# Patient Record
Sex: MALE | Race: WHITE | Employment: STUDENT | URBAN - METROPOLITAN AREA
[De-identification: names, ages, dates, MRNs, and addresses within clinical notes are randomized per-mention and may not be internally consistent; named-entity substitution may affect disease eponyms.]

---

## 2024-09-19 ENCOUNTER — OFFICE VISIT (OUTPATIENT)
Dept: ORTHOPEDIC SURGERY | Age: 19
End: 2024-09-19

## 2024-09-19 VITALS — BODY MASS INDEX: 21.82 KG/M2 | HEIGHT: 74 IN | WEIGHT: 170 LBS

## 2024-09-19 DIAGNOSIS — S43.432A SUPERIOR LABRUM ANTERIOR-TO-POSTERIOR (SLAP) TEAR OF LEFT SHOULDER: Primary | ICD-10-CM

## 2024-09-19 DIAGNOSIS — M25.511 RIGHT SHOULDER PAIN, UNSPECIFIED CHRONICITY: ICD-10-CM

## 2024-09-30 ENCOUNTER — EVALUATION (OUTPATIENT)
Dept: PHYSICAL THERAPY | Age: 19
End: 2024-09-30
Payer: COMMERCIAL

## 2024-09-30 DIAGNOSIS — M25.612 DECREASED ROM OF LEFT SHOULDER: ICD-10-CM

## 2024-09-30 DIAGNOSIS — M25.512 LEFT SHOULDER PAIN, UNSPECIFIED CHRONICITY: ICD-10-CM

## 2024-09-30 DIAGNOSIS — S43.432D SUPERIOR GLENOID LABRUM LESION OF LEFT SHOULDER, SUBSEQUENT ENCOUNTER: Primary | ICD-10-CM

## 2024-09-30 PROCEDURE — 97530 THERAPEUTIC ACTIVITIES: CPT | Performed by: PHYSICAL THERAPIST

## 2024-09-30 PROCEDURE — 97161 PT EVAL LOW COMPLEX 20 MIN: CPT | Performed by: PHYSICAL THERAPIST

## 2024-09-30 PROCEDURE — 97110 THERAPEUTIC EXERCISES: CPT | Performed by: PHYSICAL THERAPIST

## 2024-09-30 NOTE — PROGRESS NOTES
[facility]    [address] [place] [address]   P:[phone]  F:[phone]     Physical Therapy Initial Evaluation Certification      Dear Dr. Villalobos,    We had the pleasure of evaluating the following patient for physical therapy services at [facility].  A summary of our findings can be found in the initial assessment below.  This includes our plan of care.  If you have any questions or concerns regarding these findings, please do not hesitate to contact me at the office phone number listed above.  Thank you for the referral.     Physician Signature:_______________________________Date:__________________  By signing above (or electronic signature), therapist’s plan is approved by physician       Physical Therapy: TREATMENT/PROGRESS NOTE   Patient: Nate López (19 y.o. male)   Examination Date: 2024   :  2005 MRN: [id number]   Visit #: 1   Insurance Allowable Auth Needed   90 []Yes    [x]No    Insurance: Payor: [facility] / Plan: [facility] [place] PPO / Product Type: *No Product type* /   Insurance ID: [id number] - ([facility])  Secondary Insurance (if applicable):    Treatment Diagnosis:     ICD-10-CM    1. Superior glenoid labrum lesion of left shoulder, subsequent encounter  S43.432D       2. Left shoulder pain, unspecified chronicity  M25.512       3. Decreased ROM of left shoulder  M25.612          Medical Diagnosis:  Superior labrum anterior-to-posterior (SLAP) tear of left shoulder [S43.432A]   Referring Physician: Tae Stover DO  PCP: No primary care provider on file.     Plan of care signed (Y/N): N    Date of Patient follow up with Physician: not scheduled     Plan of Care Report: EVAL today  POC update due: (10 visits /OR AUTH LIMITS, whichever is less)  10/30/2024                                             Medical History:  Comorbidities:  None  Relevant Medical History: Hx of R elbow injury and GIRD of

## 2024-10-15 ENCOUNTER — OFFICE VISIT (OUTPATIENT)
Dept: ORTHOPEDIC SURGERY | Age: 19
End: 2024-10-15
Payer: COMMERCIAL

## 2024-10-15 VITALS — BODY MASS INDEX: 21.82 KG/M2 | WEIGHT: 170 LBS | HEIGHT: 74 IN

## 2024-10-15 DIAGNOSIS — M25.511 RIGHT SHOULDER PAIN, UNSPECIFIED CHRONICITY: Primary | ICD-10-CM

## 2024-10-15 DIAGNOSIS — S43.432A SUPERIOR LABRUM ANTERIOR-TO-POSTERIOR (SLAP) TEAR OF LEFT SHOULDER: ICD-10-CM

## 2024-10-15 PROCEDURE — 99213 OFFICE O/P EST LOW 20 MIN: CPT | Performed by: PHYSICIAN ASSISTANT

## 2024-10-15 NOTE — PROGRESS NOTES
[facility]  History and Physical  Shoulder Pain    Date:  10/15/2024    Name:  Nate López  Address:  [address] [place] [address]    :  2005      Age:   19 y.o.    SSN:  [id number]      Medical Record Number:  [id number]    Reason for Visit:    Shoulder Pain (LEFT SHOULDER)      HPI:   Nate López is a 19 y.o. male who presents to our office today for follow up of the left shoulder pain.  Patient unfortunately had an acute injury while he was [employer] boarding in [facility].  He injured his left shoulder and was asked to get an MRI to evaluate the shoulder for a labral tear.  Patient returns he has no pain at this time.  He has returned back to his gym workouts that includes heavy lifting and has not noticed any instability episodes with the left shoulder.  He denies any new injuries.      Pain Assessment  Location of Pain: Shoulder  Location Modifiers: Left  Severity of Pain: 0  Frequency of Pain: Intermittent  Aggravating Factors: Other (Comment)  Limiting Behavior: Some  Relieving Factors: Rest, Exercise  Work-Related Injury: No  Are there other pain locations you wish to document?: No    No notes on file    Review of Systems:  A 14 point review of systems available in the scanned medical record as documented by the patient and reviewed on 10/15/2024.  The review is negative with the exception of those things mentioned in the History of Present Illness and Past Medical History.      Past Medical History:  Patient's medications, allergies, past medical, surgical, social and family histories were reviewed and updated as appropriate.    Allergies:  No Known Allergies    Physical Exam:  There were no vitals filed for this visit.  General: Nate López is a healthy and well appearing 19 y.o. male who is sitting comfortably in our office in acute distress.     Skin:  There are no skin lesions, cellulitis, or extreme edema. The patient has warm

## 2024-10-24 ENCOUNTER — TREATMENT (OUTPATIENT)
Dept: PHYSICAL THERAPY | Age: 19
End: 2024-10-24
Payer: COMMERCIAL

## 2024-10-24 DIAGNOSIS — S43.432D SUPERIOR GLENOID LABRUM LESION OF LEFT SHOULDER, SUBSEQUENT ENCOUNTER: Primary | ICD-10-CM

## 2024-10-24 DIAGNOSIS — M25.612 DECREASED ROM OF LEFT SHOULDER: ICD-10-CM

## 2024-10-24 DIAGNOSIS — M25.512 LEFT SHOULDER PAIN, UNSPECIFIED CHRONICITY: ICD-10-CM

## 2024-10-24 PROCEDURE — 97110 THERAPEUTIC EXERCISES: CPT | Performed by: PHYSICAL THERAPIST

## 2024-10-24 PROCEDURE — 97112 NEUROMUSCULAR REEDUCATION: CPT | Performed by: PHYSICAL THERAPIST

## 2024-10-24 PROCEDURE — 97530 THERAPEUTIC ACTIVITIES: CPT | Performed by: PHYSICAL THERAPIST

## 2024-10-24 NOTE — PROGRESS NOTES
bilateral shoulder extension and prone bilateral horizontal abduction with cuing needed to pull his shoulder blades down and in and to keep his neck relaxed.  Also added wall clock with black tband which was challenging.  Introduced up/down and side/side with ball on wall today which was very fatiguing.  Reviewed/updated HEP with pt and recommended he continue the exercises 3x/week.  Pt declined new handouts.  Also recommended attending therapy consistently in order to progress his program and work towards functional goals.  Attendance has been difficult due to his work and school schedule.                Medical Necessity Documentation:  I certify that this patient meets the below criteria necessary for medical necessity for care and/or justification of therapy services:  The patient has functional impairments and/or activity limitations and would benefit from continued outpatient therapy services to address the deficits outlined in the patients goals    Return to Play: NA    Prognosis for POC: [x] Good [] Fair  [] Poor    Patient requires continued skilled intervention: [x] Yes  [] No      CHARGE CAPTURE     PT CHARGE GRID   CPT Code (TIMED) minutes # CPT Code (UNTIMED) #     Therex (55732)  20' 1  EVAL:LOW (72668 - Typically 20 minutes face-to-face)     Neuromusc. Re-ed (16925) 12' 1  Re-Eval (94924)     Manual (41212)    Estim Unattended (41487)     Ther. Act (96495) 15' 1  Mech. Traction (47791)     Gait (78734)    Dry Needle 1-2 muscle (20560)     Aquatic Therex (74121)    Dry Needle 3+ muscle (20561)     Iontophoresis (48422)    VASO (09613)     Ultrasound (71988)    Group Therapy (15746)     Estim Attended (67635)    Canalith Repositioning (45196)     Physical Performance Test (83294)         Other:    Other:    Total Timed Code Tx Minutes 47' 3       Total Treatment Minutes 50'        Charge Justification:  (51183) THERAPEUTIC EXERCISE - Provided verbal/tactile cueing for activities related to

## 2024-11-07 ENCOUNTER — TREATMENT (OUTPATIENT)
Dept: PHYSICAL THERAPY | Age: 19
End: 2024-11-07

## 2024-11-07 DIAGNOSIS — M25.612 DECREASED ROM OF LEFT SHOULDER: ICD-10-CM

## 2024-11-07 DIAGNOSIS — S43.432D SUPERIOR GLENOID LABRUM LESION OF LEFT SHOULDER, SUBSEQUENT ENCOUNTER: Primary | ICD-10-CM

## 2024-11-07 DIAGNOSIS — M25.512 LEFT SHOULDER PAIN, UNSPECIFIED CHRONICITY: ICD-10-CM

## 2024-11-07 NOTE — PROGRESS NOTES
neuromuscular reeducation of movement, balance, coordination, kinesthetic sense, posture, and/or proprioception for sitting and/or standing activities  (05823) HOME EXERCISE PROGRAM - Reviewed/Progressed HEP activities related to neuromuscular reeducation of movement, balance, coordination, kinesthetic sense, posture, and/or proprioception for sitting and/or standing activities    (12930) THERAPEUTIC ACTIVITY - use of dynamic activities to improve functional performance. (Ex include squatting, ascending/descending stairs, walking, bending, lifting, catching, throwing, pushing, pulling, jumping.)  Direct, one on one contact, billed in 15-minute increments.    GOALS (Goals updated 11/7/2024)     Patient stated goal: Improve strength in L RTC  [] Progressing: [x] Met: [] Not Met: [] Adjusted    Therapist goals for Patient:   Short Term Goals: To be achieved in: 2 weeks  1. Independent in HEP and progression per patient tolerance, in order to prevent re-injury.   [] Progressing: [x] Met: [] Not Met: [] Adjusted  2. Patient will have a decrease in pain to 0/10 to facilitate improvement in movement, function, and ADLs as indicated by Functional Deficits.  [x] Progressing: [] Met: [] Not Met: [] Adjusted    Long Term Goals:  Pt has an appt with Dr. Villalobos on 11/21/2024.  If return to skilled PT, LTGs to be set at that time.    Overall Progression Towards Functional goals/ Treatment Progress Update:  [x] Patient is progressing as expected towards functional goals listed.    [] Progression is slowed due to complexities/Impairments listed.  [] Progression has been slowed due to co-morbidities.  [] Plan just implemented, too soon (<30days) to assess goals progression   [] Goals require adjustment due to lack of progress  [] Patient is not progressing as expected and requires additional follow up with physician  [] Other:     TREATMENT PLAN     Frequency/Duration: 1-2x/week for 4-6 weeks for the following treatment

## 2024-11-21 ENCOUNTER — OFFICE VISIT (OUTPATIENT)
Dept: ORTHOPEDIC SURGERY | Age: 19
End: 2024-11-21
Payer: COMMERCIAL

## 2024-11-21 VITALS — HEIGHT: 74 IN | WEIGHT: 170 LBS | BODY MASS INDEX: 21.82 KG/M2

## 2024-11-21 DIAGNOSIS — M25.511 RIGHT SHOULDER PAIN, UNSPECIFIED CHRONICITY: Primary | ICD-10-CM

## 2024-11-21 DIAGNOSIS — S43.432A TEAR OF LEFT GLENOID LABRUM, INITIAL ENCOUNTER: ICD-10-CM

## 2024-11-21 PROCEDURE — 99213 OFFICE O/P EST LOW 20 MIN: CPT | Performed by: ORTHOPAEDIC SURGERY

## 2024-11-21 NOTE — PROGRESS NOTES
shoulder pain, unspecified chronicity Yes    Tear of left glenoid labrum, initial encounter        Office Procedures:  Orders Placed This Encounter   Procedures    [facility] [place] [facility]     Referral Priority:   Routine     Referral Type:   Eval and Treat     Referral Reason:   Specialty Services Required     Requested Specialty:   Physical Therapy     Number of Visits Requested:   1       Plan:   Nate López has not had any more of instability episodes since his initial injury.  We recommend that he work with formal physical therapy and eventually transition to home-based program.  He may continue to activities as tolerated.  As long as this minor discomfort he can continue to watch it however if he starts to have instability we can then consider surgical intervention.  All of this was discussed in full detail with the patient and his mom was present throughout the entire visit.  All their questions were fully answered today.  Will see him back in 6 to 8 weeks should he continue to have any persistent symptoms.    Nate López was in agreement with this plan and all questions were answered to the patient's satisfaction. He and his mother were encouraged to call with any questions.     11/21/2024  1:45 PM      Sushma Nagar, PA-C  Orthopaedic Sports Medicine Physician Assistant      This dictation was performed with a verbal recognition program (DRAGON) and it was checked for errors.  It is possible that there are still dictated errors within this office note.  If so, please bring any errors to my attention for an addendum.  All efforts were made to ensure that this office note is accurate.  _________________  I, Dr. Romy Villalobos, personally performed the services described in this documentation as described by Sushma Nagar, PA-C in my presence, and it is both accurate and complete.    Romy Villalobos MD, PhD  11/21/2024